# Patient Record
Sex: FEMALE | Race: BLACK OR AFRICAN AMERICAN | NOT HISPANIC OR LATINO | URBAN - METROPOLITAN AREA
[De-identification: names, ages, dates, MRNs, and addresses within clinical notes are randomized per-mention and may not be internally consistent; named-entity substitution may affect disease eponyms.]

---

## 2024-09-10 ENCOUNTER — EMERGENCY (EMERGENCY)
Facility: HOSPITAL | Age: 44
LOS: 1 days | Discharge: HOME | End: 2024-09-10
Attending: EMERGENCY MEDICINE
Payer: SELF-PAY

## 2024-09-10 VITALS
TEMPERATURE: 98 F | OXYGEN SATURATION: 100 % | SYSTOLIC BLOOD PRESSURE: 148 MMHG | RESPIRATION RATE: 20 BRPM | HEART RATE: 58 BPM | WEIGHT: 250 LBS | DIASTOLIC BLOOD PRESSURE: 105 MMHG | HEIGHT: 68 IN

## 2024-09-10 VITALS — DIASTOLIC BLOOD PRESSURE: 87 MMHG | SYSTOLIC BLOOD PRESSURE: 151 MMHG

## 2024-09-10 PROCEDURE — 73590 X-RAY EXAM OF LOWER LEG: CPT | Mod: 26,LT

## 2024-09-10 PROCEDURE — 99284 EMERGENCY DEPT VISIT MOD MDM: CPT

## 2024-09-10 PROCEDURE — 99283 EMERGENCY DEPT VISIT LOW MDM: CPT | Mod: 25

## 2024-09-10 PROCEDURE — 73590 X-RAY EXAM OF LOWER LEG: CPT

## 2024-09-10 RX ORDER — ACETAMINOPHEN 325 MG/1
975 TABLET ORAL ONCE
Refills: 0 | Status: COMPLETED | OUTPATIENT
Start: 2024-09-10 | End: 2024-09-10

## 2024-09-10 RX ORDER — IBUPROFEN 600 MG
600 TABLET ORAL ONCE
Refills: 0 | Status: COMPLETED | OUTPATIENT
Start: 2024-09-10 | End: 2024-09-10

## 2024-09-10 RX ADMIN — ACETAMINOPHEN 975 MILLIGRAM(S): 325 TABLET ORAL at 15:48

## 2024-09-10 RX ADMIN — Medication 600 MILLIGRAM(S): at 15:55

## 2024-09-10 NOTE — ED PROVIDER NOTE - AVIAN FLU SYMPTOMS
No IBW: 52.3 kg     12-18-23 @ 07:01  -  12-18-23 @ 22:54  --------------------------------------------------------  OUT:    Nasogastric/Oral tube (mL): 0 mL  Total OUT: 0 mL    Total NET: 0 mL

## 2024-09-10 NOTE — ED PROVIDER NOTE - ATTENDING APP SHARED VISIT CONTRIBUTION OF CARE
Attending MD Coker: I personally made/approved the management plan and take responsibility for the patient management.    43-year-old woman is presenting for evaluation of left posterior calf pain.  The patient states that she nearly tripped over some wires on the ground and felt pain in the back of her calf.  Has been able to put some weight on it but with pain.    Patient's vital signs are notable for blood pressure 148/105 otherwise nonactionable.  The patient is sitting in the stretcher in no apparent distress.  Focused examination of left lower extremity reveals intact DP pulse left foot, the left lower extremity is warm to the touch.  There is tenderness of the left posterior calf, calf compartment is soft.  Patient can straight leg raise left leg, there is no bony tenderness of the left knee.  The Achilles on the left is nontender.  Patient can plantarflex 5/5 strength.  Normal Dior test bilaterally.    Presentation is consistent with likely calf strain.  No evidence of complete disruption or Achilles injury.  Plan at this time will be for screening x-ray to rule out any avulsion injuries p.o. pain meds and referral to sports medicine for longitudinal care.      *The above represents an initial assessment/impression. Please refer to progress notes for potential changes in patient clinical course*

## 2024-09-10 NOTE — ED PROVIDER NOTE - CARE PLAN
Principal Discharge DX:	Blunt trauma of left lower leg  Secondary Diagnosis:	Asymptomatic hypertension   1

## 2024-09-10 NOTE — ED PROVIDER NOTE - CLINICAL SUMMARY MEDICAL DECISION MAKING FREE TEXT BOX
s/p LLE posterior calf pain s/p trip  no fall,  pain control,  xrya to r/o fx,  ace wrap  dc h ome with ortho f/u

## 2024-09-10 NOTE — ED PROVIDER NOTE - CARE PROVIDER_API CALL
Zak Hernandez  Orthopaedic Surgery  [address]  [place] [address]  Phone: [phone]  Fax: [phone]  Follow Up Time:

## 2024-09-10 NOTE — ED PROVIDER NOTE - NSFOLLOWUPINSTRUCTIONS_ED_ALL_ED_FT
Thank you for visiting our Emergency Department, it has been a pleasure taking part in your healthcare. Please follow up with your primary doctor within x48 hours.    Your discharge diagnosis is: LEft leg injury    Return precautions to the Emergency Department include but are not limited to: unrelenting nausea, vomiting, fever, chills, chest pain, shortness of breath, dizziness, abdominal pain, worsening pain, syncope, blood in urine or stool, headache that doesn't resolve, numbness or tingling, loss of sensation, loss of motor function, or any other concerning symptoms.       -- Please use 650mg Tylenol (also called acetaminophen) every 4 hours & 600mg Motrin (also called Advil or ibuprofen) every 6 hours as needed for pain/discomfort/swelling. You can get these without a prescription. Don't use more than 3500mg of Tylenol in any 24-hour period. Make sure your other prescription/over-the-counter medications don't contain any Tylenol so you don't take too much. If you have any stomach discomfort while taking Motrin, you can use TUMS or Pepcid or Zantac (these can all be bought without a prescription).     wear ace wrap for comfort  do not wear at night  use crutches to help you ambualte    follow up with orthopedist if pain persists   Dr. Zak Hernandez [phone] Thank you for visiting our Emergency Department, it has been a pleasure taking part in your healthcare. Please follow up with your primary doctor within x48 hours - your blood pressure was elevated during your time in the emergency department  - please discuss with your PMD.     Your discharge diagnosis is: LEft leg injury    Return precautions to the Emergency Department include but are not limited to: unrelenting nausea, vomiting, fever, chills, chest pain, shortness of breath, dizziness, abdominal pain, worsening pain, syncope, blood in urine or stool, headache that doesn't resolve, numbness or tingling, loss of sensation, loss of motor function, or any other concerning symptoms.       -- Please use 650mg Tylenol (also called acetaminophen) every 4 hours & 600mg Motrin (also called Advil or ibuprofen) every 6 hours as needed for pain/discomfort/swelling. You can get these without a prescription. Don't use more than 3500mg of Tylenol in any 24-hour period. Make sure your other prescription/over-the-counter medications don't contain any Tylenol so you don't take too much. If you have any stomach discomfort while taking Motrin, you can use TUMS or Pepcid or Zantac (these can all be bought without a prescription).     wear ace wrap for comfort  do not wear at night  use crutches to help you ambulate  follow up with orthopedist if pain persists   Dr. Zak Hernandez [phone]

## 2024-09-10 NOTE — ED PROVIDER NOTE - PATIENT PORTAL LINK FT
You can access the FollowMyHealth Patient Portal offered by [facility] by registering at the following website: [email or web address]. By joining [employer]’s FollowMyHealth portal, you will also be able to view your health information using other applications (apps) compatible with our system.
